# Patient Record
Sex: FEMALE
[De-identification: names, ages, dates, MRNs, and addresses within clinical notes are randomized per-mention and may not be internally consistent; named-entity substitution may affect disease eponyms.]

---

## 2017-08-20 ENCOUNTER — HOSPITAL ENCOUNTER (EMERGENCY)
Dept: HOSPITAL 42 - ED | Age: 62
Discharge: HOME | End: 2017-08-20
Payer: COMMERCIAL

## 2017-08-20 VITALS — OXYGEN SATURATION: 98 %

## 2017-08-20 VITALS — HEART RATE: 63 BPM | SYSTOLIC BLOOD PRESSURE: 115 MMHG | RESPIRATION RATE: 17 BRPM | DIASTOLIC BLOOD PRESSURE: 60 MMHG

## 2017-08-20 VITALS — TEMPERATURE: 98.5 F

## 2017-08-20 VITALS — BODY MASS INDEX: 27.4 KG/M2

## 2017-08-20 DIAGNOSIS — N39.0: Primary | ICD-10-CM

## 2017-08-20 DIAGNOSIS — R10.9: ICD-10-CM

## 2017-08-20 DIAGNOSIS — K42.9: ICD-10-CM

## 2017-08-20 LAB
ALBUMIN/GLOB SERPL: 1.4 {RATIO}
ALP SERPL-CCNC: 91 U/L
ALT SERPL-CCNC: 64 U/L
AMORPH SED URNS QL MICRO: (no result)
APPEARANCE UR: CLEAR
APTT BLD: 26 SECONDS
AST SERPL-CCNC: 74 U/L
BACTERIA #/AREA URNS HPF: (no result) /[HPF]
BASOPHILS # BLD AUTO: 0.03 K/MM3
BASOPHILS NFR BLD: 0.6 %
BILIRUB SERPL-MCNC: 0.6 MG/DL
BILIRUB UR-MCNC: NEGATIVE MG/DL
BUN SERPL-MCNC: 15 MG/DL
CALCIUM SERPL-MCNC: 9.4 MG/DL
CHLORIDE SERPL-SCNC: 104 MMOL/L
CO2 SERPL-SCNC: 26 MMOL/L
COLOR UR: YELLOW
EOSINOPHIL # BLD: 0.2 10*3/UL
EOSINOPHIL NFR BLD: 2.8 %
ERYTHROCYTE [DISTWIDTH] IN BLOOD BY AUTOMATED COUNT: 13.7 %
GLOBULIN SER-MCNC: 3.1 GM/DL
GLUCOSE SERPL-MCNC: 100 MG/DL
GLUCOSE UR STRIP-MCNC: NEGATIVE MG/DL
GRANULOCYTES # BLD: 2.78 10*3/UL
GRANULOCYTES NFR BLD: 52.5 %
HCT VFR BLD CALC: 39.1 %
INR PPP: 0.93
KETONES UR STRIP-MCNC: (no result) MG/DL
LEUKOCYTE ESTERASE UR-ACNC: (no result) LEU/UL
LIPASE SERPL-CCNC: 95 U/L
LYMPHOCYTES # BLD: 1.9 10*3/UL
LYMPHOCYTES NFR BLD AUTO: 36.7 %
MCH RBC QN AUTO: 29.7 PG
MCHC RBC AUTO-ENTMCNC: 33.8 G/DL
MCV RBC AUTO: 88.1 FL
MONOCYTES # BLD AUTO: 0.4 10*3/UL
MONOCYTES NFR BLD: 7.4 %
PH UR STRIP: 5.5 [PH]
PLATELET # BLD: 296 10^3/UL
PMV BLD AUTO: 8.2 FL
POTASSIUM SERPL-SCNC: 4.2 MMOL/L
PROT SERPL-MCNC: 7.3 G/DL
PROT UR STRIP-MCNC: NEGATIVE MG/DL
RBC # UR STRIP: NEGATIVE /UL
RBC #/AREA URNS HPF: (no result) /HPF
SODIUM SERPL-SCNC: 142 MMOL/L
SP GR UR STRIP: 1.02
UROBILINOGEN UR STRIP-ACNC: 0.2 E.U./DL
WBC # BLD AUTO: 5.3 10^3/UL

## 2017-08-20 PROCEDURE — 99285 EMERGENCY DEPT VISIT HI MDM: CPT

## 2017-08-20 PROCEDURE — 85610 PROTHROMBIN TIME: CPT

## 2017-08-20 PROCEDURE — 85730 THROMBOPLASTIN TIME PARTIAL: CPT

## 2017-08-20 PROCEDURE — 96375 TX/PRO/DX INJ NEW DRUG ADDON: CPT

## 2017-08-20 PROCEDURE — 83690 ASSAY OF LIPASE: CPT

## 2017-08-20 PROCEDURE — 85025 COMPLETE CBC W/AUTO DIFF WBC: CPT

## 2017-08-20 PROCEDURE — 80053 COMPREHEN METABOLIC PANEL: CPT

## 2017-08-20 PROCEDURE — 82948 REAGENT STRIP/BLOOD GLUCOSE: CPT

## 2017-08-20 PROCEDURE — 81001 URINALYSIS AUTO W/SCOPE: CPT

## 2017-08-20 PROCEDURE — 87086 URINE CULTURE/COLONY COUNT: CPT

## 2017-08-20 PROCEDURE — 86900 BLOOD TYPING SEROLOGIC ABO: CPT

## 2017-08-20 PROCEDURE — 74176 CT ABD & PELVIS W/O CONTRAST: CPT

## 2017-08-20 PROCEDURE — 86850 RBC ANTIBODY SCREEN: CPT

## 2017-08-20 PROCEDURE — 96374 THER/PROPH/DIAG INJ IV PUSH: CPT

## 2017-08-20 NOTE — CT
PROCEDURE:  CT Abdomen and Pelvis without intravenous contrast



HISTORY:

abd pain r/o obstruction



COMPARISON:

None.



TECHNIQUE:

Without contrast.. 



Contrast Dose: 0



Radiation dose:



Total exam DLP = 1024.63 mGy-cm.



This CT exam was performed using one or more of the following dose 

reduction techniques: Automated exposure control, adjustment of the 

mA and/or kV according to patient size, and/or use of iterative 

reconstruction technique.



FINDINGS:



LOWER THORAX:

Unremarkable. 



LIVER:

Unremarkable. No gross lesion or ductal dilatation.  



GALLBLADDER AND BILE DUCTS:

Unremarkable. 



PANCREAS:

Unremarkable. No gross lesion or ductal dilatation.



SPLEEN:

Unremarkable. 



ADRENALS:

Unremarkable. No mass. 



KIDNEYS AND URETERS:

Unremarkable. No hydronephrosis. No solid mass. 



VASCULATURE:

Unremarkable. No aortic aneurysm. 



BOWEL:

Unremarkable. No obstruction. No gross mural thickening. 



APPENDIX:

Unremarkable. Normal appendix. 



PERITONEUM:

Unremarkable. No free fluid. No free air. 



LYMPH NODES:

Unremarkable. No enlarged lymph nodes. 



BLADDER:

Nondistended 



REPRODUCTIVE:

Status post hysterectomy 



BONES:

Multilevel lumbar degenerative disc disease. 



OTHER FINDINGS:

None.



IMPRESSION:

No acute abnormality.  No evidence of bowel obstruction.
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
## 2018-01-28 ENCOUNTER — HOSPITAL ENCOUNTER (EMERGENCY)
Dept: HOSPITAL 42 - ED | Age: 63
Discharge: HOME | End: 2018-01-28
Payer: COMMERCIAL

## 2018-01-28 VITALS
DIASTOLIC BLOOD PRESSURE: 70 MMHG | TEMPERATURE: 97.7 F | SYSTOLIC BLOOD PRESSURE: 122 MMHG | HEART RATE: 80 BPM | OXYGEN SATURATION: 99 %

## 2018-01-28 VITALS — RESPIRATION RATE: 18 BRPM

## 2018-01-28 VITALS — BODY MASS INDEX: 33.8 KG/M2

## 2018-01-28 DIAGNOSIS — R21: ICD-10-CM

## 2018-01-28 DIAGNOSIS — L57.0: Primary | ICD-10-CM
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
## 2018-04-29 ENCOUNTER — HOSPITAL ENCOUNTER (EMERGENCY)
Dept: HOSPITAL 42 - ED | Age: 63
Discharge: HOME | End: 2018-04-29
Payer: COMMERCIAL

## 2018-04-29 VITALS — OXYGEN SATURATION: 98 %

## 2018-04-29 VITALS — RESPIRATION RATE: 18 BRPM

## 2018-04-29 VITALS — SYSTOLIC BLOOD PRESSURE: 114 MMHG | DIASTOLIC BLOOD PRESSURE: 66 MMHG | HEART RATE: 62 BPM

## 2018-04-29 VITALS — TEMPERATURE: 98.5 F

## 2018-04-29 VITALS — BODY MASS INDEX: 34.7 KG/M2

## 2018-04-29 DIAGNOSIS — R10.9: Primary | ICD-10-CM

## 2018-04-29 DIAGNOSIS — M06.9: ICD-10-CM

## 2018-04-29 DIAGNOSIS — I10: ICD-10-CM

## 2018-04-29 DIAGNOSIS — J44.9: ICD-10-CM

## 2018-04-29 DIAGNOSIS — I48.91: ICD-10-CM

## 2018-04-29 LAB
ALBUMIN SERPL-MCNC: 4.1 G/DL (ref 3–4.8)
ALBUMIN/GLOB SERPL: 1.3 {RATIO} (ref 1.1–1.8)
ALT SERPL-CCNC: 96 U/L (ref 7–56)
APPEARANCE UR: CLEAR
APTT BLD: 28.9 SECONDS (ref 25.1–36.5)
AST SERPL-CCNC: 153 U/L (ref 14–36)
BASOPHILS # BLD AUTO: 0.02 K/MM3 (ref 0–2)
BASOPHILS NFR BLD: 0.3 % (ref 0–3)
BILIRUB UR-MCNC: NEGATIVE MG/DL
BUN SERPL-MCNC: 10 MG/DL (ref 7–21)
CALCIUM SERPL-MCNC: 9 MG/DL (ref 8.4–10.5)
COLOR UR: YELLOW
EOSINOPHIL # BLD: 0.1 10*3/UL (ref 0–0.7)
EOSINOPHIL NFR BLD: 1.6 % (ref 1.5–5)
ERYTHROCYTE [DISTWIDTH] IN BLOOD BY AUTOMATED COUNT: 14 % (ref 11.5–14.5)
GFR NON-AFRICAN AMERICAN: > 60
GLUCOSE UR STRIP-MCNC: NEGATIVE MG/DL
GRANULOCYTES # BLD: 3.5 10*3/UL (ref 1.4–6.5)
GRANULOCYTES NFR BLD: 60.7 % (ref 50–68)
HGB BLD-MCNC: 12.8 G/DL (ref 12–16)
INR PPP: 1.04 (ref 0.93–1.08)
LEUKOCYTE ESTERASE UR-ACNC: NEGATIVE LEU/UL
LIPASE SERPL-CCNC: 58 U/L (ref 23–300)
LYMPHOCYTES # BLD: 1.8 10*3/UL (ref 1.2–3.4)
LYMPHOCYTES NFR BLD AUTO: 30.5 % (ref 22–35)
MCH RBC QN AUTO: 30 PG (ref 25–35)
MCHC RBC AUTO-ENTMCNC: 34 G/DL (ref 31–37)
MCV RBC AUTO: 88.1 FL (ref 80–105)
MONOCYTES # BLD AUTO: 0.4 10*3/UL (ref 0.1–0.6)
MONOCYTES NFR BLD: 6.9 % (ref 1–6)
PH UR STRIP: 6 [PH] (ref 4.7–8)
PLATELET # BLD: 287 10^3/UL (ref 120–450)
PMV BLD AUTO: 8.6 FL (ref 7–11)
PROT UR STRIP-MCNC: NEGATIVE MG/DL
PROTHROMBIN TIME: 12 SECONDS (ref 9.4–12.5)
RBC # BLD AUTO: 4.27 10^6/UL (ref 3.5–6.1)
RBC # UR STRIP: NEGATIVE /UL
SP GR UR STRIP: >= 1.03 (ref 1–1.03)
UROBILINOGEN UR STRIP-ACNC: 0.2 E.U./DL
WBC # BLD AUTO: 5.8 10^3/UL (ref 4.5–11)

## 2018-04-29 PROCEDURE — 99284 EMERGENCY DEPT VISIT MOD MDM: CPT

## 2018-04-29 PROCEDURE — 74176 CT ABD & PELVIS W/O CONTRAST: CPT

## 2018-04-29 PROCEDURE — 96374 THER/PROPH/DIAG INJ IV PUSH: CPT

## 2018-04-29 PROCEDURE — 80053 COMPREHEN METABOLIC PANEL: CPT

## 2018-04-29 PROCEDURE — 85025 COMPLETE CBC W/AUTO DIFF WBC: CPT

## 2018-04-29 PROCEDURE — 81003 URINALYSIS AUTO W/O SCOPE: CPT

## 2018-04-29 PROCEDURE — 85610 PROTHROMBIN TIME: CPT

## 2018-04-29 PROCEDURE — 85730 THROMBOPLASTIN TIME PARTIAL: CPT

## 2018-04-29 PROCEDURE — 83690 ASSAY OF LIPASE: CPT

## 2018-04-29 PROCEDURE — 87086 URINE CULTURE/COLONY COUNT: CPT
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
## 2018-04-29 NOTE — CT
PROCEDURE:  CT Abdomen and Pelvis with Oral contrast.



HISTORY:

abd pain r/o obstruction



COMPARISON:

Hernia.



TECHNIQUE:

Contiguous axial images of the abdomen and pelvis. Oral contrast was 

administered. No IV contrast given. Coronal and Sagittal reformats 

generated. 



Radiation dose:



Total exam DLP =  



This CT exam was performed using one or more of the following dose 

reduction techniques: Automated exposure control, adjustment of the 

mA and/or kV according to patient size, and/or use of iterative 

reconstruction technique.



FINDINGS:



LOWER THORAX:

Evaluation of the lung bases reveals mild interstitial change and 

scarring, without focal infiltrate or effusion.  Distal esophagus 

shows minor thickening without hiatal hernia. No pericardial effusion 

is seen. Visualized stomach and duodenum are unremarkable.



LIVER:

Noncontrast images of the liver show no evidence of new mass or 

intrahepatic ductal dilatation.



GALLBLADDER AND BILE DUCTS:

Unremarkable. 



PANCREAS:

Unremarkable. No mass. No ductal dilatation.



SPLEEN:

Unremarkable. No splenomegaly. 



ADRENALS:

Unremarkable. 



KIDNEYS AND URETERS:

Kidneys show no evidence of hydronephrosis, mass, or calculus. No 

perinephric changes are seen. 



BLADDER:

Bladder is decompressed but otherwise normal in outline.



REPRODUCTIVE:

Uterus appears to been previously removed. No pelvic masses are 

noted. 



APPENDIX:

Unremarkable.



BOWEL:

Colon shows no evidence of colonic wall thickening or pericolonic 

inflammatory change. No small bowel obstruction is appreciated. There 

may be some slight fold thickening of the small bowel which may 

suggest mild enteritis although no significant mesenteric thickening 

or adenopathy is noted. 



PERITONEUM:

No ascites is seen. There is evidence of a previously noted small 

umbilical hernia with fat but no appreciable bowel.



LYMPH NODES:

Unremarkable. No enlarged lymph nodes. 



VASCULATURE:

Unchanged 



BONES:

 Degenerative changes are seen in the spine. No lytic process is 

noted.



OTHER FINDINGS:

None. 



IMPRESSION:

No appreciable CT scan evidence of bowel obstruction.  No CT scan 

evidence of acute colitis.  Mild fold thickening of the small bowel 

which may suggest some mild enteritis. No CT scan evidence of 

appendicitis.  Stable small umbilical

## 2018-05-19 ENCOUNTER — HOSPITAL ENCOUNTER (EMERGENCY)
Dept: HOSPITAL 42 - ED | Age: 63
Discharge: HOME | End: 2018-05-19
Payer: COMMERCIAL

## 2018-05-19 VITALS — HEART RATE: 82 BPM | OXYGEN SATURATION: 99 % | SYSTOLIC BLOOD PRESSURE: 118 MMHG | DIASTOLIC BLOOD PRESSURE: 62 MMHG

## 2018-05-19 VITALS — RESPIRATION RATE: 18 BRPM | TEMPERATURE: 98.2 F

## 2018-05-19 VITALS — BODY MASS INDEX: 34.7 KG/M2

## 2018-05-19 DIAGNOSIS — B36.8: Primary | ICD-10-CM

## 2018-05-19 DIAGNOSIS — L03.311: ICD-10-CM
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
## 2018-12-21 ENCOUNTER — HOSPITAL ENCOUNTER (EMERGENCY)
Dept: HOSPITAL 42 - ED | Age: 63
Discharge: HOME | End: 2018-12-21
Payer: COMMERCIAL

## 2018-12-21 VITALS — HEART RATE: 66 BPM | DIASTOLIC BLOOD PRESSURE: 58 MMHG | SYSTOLIC BLOOD PRESSURE: 110 MMHG | OXYGEN SATURATION: 97 %

## 2018-12-21 VITALS — TEMPERATURE: 98.1 F | RESPIRATION RATE: 17 BRPM

## 2018-12-21 VITALS — BODY MASS INDEX: 26.8 KG/M2

## 2018-12-21 DIAGNOSIS — F41.9: ICD-10-CM

## 2018-12-21 DIAGNOSIS — I10: ICD-10-CM

## 2018-12-21 DIAGNOSIS — Z95.5: ICD-10-CM

## 2018-12-21 DIAGNOSIS — R00.2: Primary | ICD-10-CM

## 2018-12-21 DIAGNOSIS — J44.9: ICD-10-CM

## 2018-12-21 LAB
ALBUMIN SERPL-MCNC: 4.2 G/DL (ref 3–4.8)
ALBUMIN/GLOB SERPL: 1.3 {RATIO} (ref 1.1–1.8)
ALT SERPL-CCNC: 78 U/L (ref 7–56)
APTT BLD: 24.3 SECONDS (ref 25.1–36.5)
AST SERPL-CCNC: 109 U/L (ref 14–36)
BASOPHILS # BLD AUTO: 0.02 K/MM3 (ref 0–2)
BASOPHILS NFR BLD: 0.3 % (ref 0–3)
BNP SERPL-MCNC: 240 PG/ML (ref 0–450)
BUN SERPL-MCNC: 10 MG/DL (ref 7–21)
CALCIUM SERPL-MCNC: 9.1 MG/DL (ref 8.4–10.5)
EOSINOPHIL # BLD: 0 10*3/UL (ref 0–0.7)
EOSINOPHIL NFR BLD: 0.5 % (ref 1.5–5)
ERYTHROCYTE [DISTWIDTH] IN BLOOD BY AUTOMATED COUNT: 13.9 % (ref 11.5–14.5)
GFR NON-AFRICAN AMERICAN: > 60
GRANULOCYTES # BLD: 3.49 10*3/UL (ref 1.4–6.5)
GRANULOCYTES NFR BLD: 56.2 % (ref 50–68)
HGB BLD-MCNC: 13.6 G/DL (ref 12–16)
INR PPP: 1.07
LYMPHOCYTES # BLD: 2.3 10*3/UL (ref 1.2–3.4)
LYMPHOCYTES NFR BLD AUTO: 36.2 % (ref 22–35)
MCH RBC QN AUTO: 29.6 PG (ref 25–35)
MCHC RBC AUTO-ENTMCNC: 33.6 G/DL (ref 31–37)
MCV RBC AUTO: 88 FL (ref 80–105)
MONOCYTES # BLD AUTO: 0.4 10*3/UL (ref 0.1–0.6)
MONOCYTES NFR BLD: 6.8 % (ref 1–6)
PLATELET # BLD: 291 10^3/UL (ref 120–450)
PMV BLD AUTO: 8.7 FL (ref 7–11)
PROTHROMBIN TIME: 12.2 SECONDS (ref 9.4–12.5)
RBC # BLD AUTO: 4.6 10^6/UL (ref 3.5–6.1)
T4 FREE SERPL-MCNC: 1.33 NG/DL (ref 0.78–2.19)
TROPONIN I SERPL-MCNC: < 0.01 NG/ML
WBC # BLD AUTO: 6.2 10^3/UL (ref 4.5–11)

## 2018-12-21 PROCEDURE — 93005 ELECTROCARDIOGRAM TRACING: CPT

## 2018-12-21 PROCEDURE — 83615 LACTATE (LD) (LDH) ENZYME: CPT

## 2018-12-21 PROCEDURE — 84484 ASSAY OF TROPONIN QUANT: CPT

## 2018-12-21 PROCEDURE — 99283 EMERGENCY DEPT VISIT LOW MDM: CPT

## 2018-12-21 PROCEDURE — 85025 COMPLETE CBC W/AUTO DIFF WBC: CPT

## 2018-12-21 PROCEDURE — 84100 ASSAY OF PHOSPHORUS: CPT

## 2018-12-21 PROCEDURE — 83735 ASSAY OF MAGNESIUM: CPT

## 2018-12-21 PROCEDURE — 96374 THER/PROPH/DIAG INJ IV PUSH: CPT

## 2018-12-21 PROCEDURE — 85730 THROMBOPLASTIN TIME PARTIAL: CPT

## 2018-12-21 PROCEDURE — 85610 PROTHROMBIN TIME: CPT

## 2018-12-21 PROCEDURE — 83880 ASSAY OF NATRIURETIC PEPTIDE: CPT

## 2018-12-21 PROCEDURE — 71045 X-RAY EXAM CHEST 1 VIEW: CPT

## 2018-12-21 PROCEDURE — 80053 COMPREHEN METABOLIC PANEL: CPT

## 2018-12-21 PROCEDURE — 84443 ASSAY THYROID STIM HORMONE: CPT

## 2018-12-21 PROCEDURE — 82550 ASSAY OF CK (CPK): CPT

## 2018-12-21 PROCEDURE — 84439 ASSAY OF FREE THYROXINE: CPT

## 2018-12-21 NOTE — RAD
Date of service: 



12/21/2018



HISTORY:

 chest pain 



COMPARISON:

12/20/2015 



FINDINGS:



LUNGS:

No active pulmonary disease.



PLEURA:

No significant pleural effusion identified, no pneumothorax apparent.



CARDIOVASCULAR:

No aortic atherosclerotic calcification present.



Normal cardiac size. No pulmonary vascular congestion. 



OSSEOUS STRUCTURES:

No significant abnormalities.



VISUALIZED UPPER ABDOMEN:

Normal.



OTHER FINDINGS:

None.



IMPRESSION:

No active disease.
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
## 2018-12-21 NOTE — CARD
--------------- APPROVED REPORT --------------





Date of service: 12/21/2018



EKG Measurement

Heart [id number]

[id number]

[id number]

[id number]

[id number]



<Conclusion>

Sinus rhythm with 1st degree AV block with premature atrial complex 

with aberrant conduction

STTW changes c/w ischemia

## 2018-12-25 ENCOUNTER — HOSPITAL ENCOUNTER (EMERGENCY)
Dept: HOSPITAL 42 - ED | Age: 63
Discharge: HOME | End: 2018-12-25
Payer: COMMERCIAL

## 2018-12-25 VITALS
OXYGEN SATURATION: 98 % | DIASTOLIC BLOOD PRESSURE: 82 MMHG | HEART RATE: 72 BPM | RESPIRATION RATE: 17 BRPM | SYSTOLIC BLOOD PRESSURE: 120 MMHG

## 2018-12-25 VITALS — TEMPERATURE: 98.2 F

## 2018-12-25 VITALS — BODY MASS INDEX: 37.5 KG/M2

## 2018-12-25 DIAGNOSIS — N23: Primary | ICD-10-CM

## 2018-12-25 DIAGNOSIS — Z95.5: ICD-10-CM

## 2018-12-25 DIAGNOSIS — E11.9: ICD-10-CM

## 2018-12-25 DIAGNOSIS — I10: ICD-10-CM

## 2018-12-25 LAB
ALBUMIN SERPL-MCNC: 4.4 G/DL (ref 3–4.8)
ALBUMIN/GLOB SERPL: 1.3 {RATIO} (ref 1.1–1.8)
ALT SERPL-CCNC: 82 U/L (ref 7–56)
APPEARANCE UR: CLEAR
AST SERPL-CCNC: 119 U/L (ref 14–36)
BASE EXCESS BLDV CALC-SCNC: 2.6 MMOL/L (ref 0–2)
BASOPHILS # BLD AUTO: 0.02 K/MM3 (ref 0–2)
BASOPHILS NFR BLD: 0.3 % (ref 0–3)
BILIRUB UR-MCNC: NEGATIVE MG/DL
BUN SERPL-MCNC: 18 MG/DL (ref 7–21)
CALCIUM SERPL-MCNC: 9.2 MG/DL (ref 8.4–10.5)
COLOR UR: (no result)
EOSINOPHIL # BLD: 0.1 10*3/UL (ref 0–0.7)
EOSINOPHIL NFR BLD: 1.4 % (ref 1.5–5)
ERYTHROCYTE [DISTWIDTH] IN BLOOD BY AUTOMATED COUNT: 13.8 % (ref 11.5–14.5)
GFR NON-AFRICAN AMERICAN: > 60
GLUCOSE UR STRIP-MCNC: NEGATIVE MG/DL
GRANULOCYTES # BLD: 3.8 10*3/UL (ref 1.4–6.5)
GRANULOCYTES NFR BLD: 51.7 % (ref 50–68)
HGB BLD-MCNC: 13.8 G/DL (ref 12–16)
LEUKOCYTE ESTERASE UR-ACNC: NEGATIVE LEU/UL
LIPASE SERPL-CCNC: 88 U/L (ref 23–300)
LYMPHOCYTES # BLD: 2.9 10*3/UL (ref 1.2–3.4)
LYMPHOCYTES NFR BLD AUTO: 39.7 % (ref 22–35)
MCH RBC QN AUTO: 29.8 PG (ref 25–35)
MCHC RBC AUTO-ENTMCNC: 33.7 G/DL (ref 31–37)
MCV RBC AUTO: 88.6 FL (ref 80–105)
MONOCYTES # BLD AUTO: 0.5 10*3/UL (ref 0.1–0.6)
MONOCYTES NFR BLD: 6.9 % (ref 1–6)
PH BLDV: 7.43 [PH] (ref 7.32–7.43)
PH UR STRIP: 5.5 [PH] (ref 4.7–8)
PLATELET # BLD: 315 10^3/UL (ref 120–450)
PMV BLD AUTO: 8.8 FL (ref 7–11)
PROT UR STRIP-MCNC: NEGATIVE MG/DL
RBC # BLD AUTO: 4.63 10^6/UL (ref 3.5–6.1)
RBC # UR STRIP: NEGATIVE /UL
SP GR UR STRIP: >= 1.03 (ref 1–1.03)
UROBILINOGEN UR STRIP-ACNC: 0.2 E.U./DL
VENOUS BLOOD FIO2: 21 %
VENOUS BLOOD GAS PCO2: 41 (ref 40–60)
VENOUS BLOOD GAS PO2: 162 MM/HG (ref 30–55)
WBC # BLD AUTO: 7.4 10^3/UL (ref 4.5–11)

## 2018-12-25 PROCEDURE — 99284 EMERGENCY DEPT VISIT MOD MDM: CPT

## 2018-12-25 PROCEDURE — 82803 BLOOD GASES ANY COMBINATION: CPT

## 2018-12-25 PROCEDURE — 80053 COMPREHEN METABOLIC PANEL: CPT

## 2018-12-25 PROCEDURE — 81003 URINALYSIS AUTO W/O SCOPE: CPT

## 2018-12-25 PROCEDURE — 83690 ASSAY OF LIPASE: CPT

## 2018-12-25 PROCEDURE — 71045 X-RAY EXAM CHEST 1 VIEW: CPT

## 2018-12-25 PROCEDURE — 93005 ELECTROCARDIOGRAM TRACING: CPT

## 2018-12-25 PROCEDURE — 96361 HYDRATE IV INFUSION ADD-ON: CPT

## 2018-12-25 PROCEDURE — 96374 THER/PROPH/DIAG INJ IV PUSH: CPT

## 2018-12-25 PROCEDURE — 83735 ASSAY OF MAGNESIUM: CPT

## 2018-12-25 PROCEDURE — 85025 COMPLETE CBC W/AUTO DIFF WBC: CPT

## 2018-12-25 PROCEDURE — 74176 CT ABD & PELVIS W/O CONTRAST: CPT
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
## 2018-12-26 NOTE — RAD
Date of service: 



12/25/2018



HISTORY:

 abdominal pain 



COMPARISON:

Portable chest 12/21/2018. 



FINDINGS:



LUNGS:

No active pulmonary disease.



PLEURA:

No significant pleural effusion identified, no pneumothorax apparent.



CARDIOVASCULAR:

Calcific atherosclerotic changes are seen related to the thoracic 

aorta.



Normal cardiac size. No pulmonary vascular congestion. 



OSSEOUS STRUCTURES:

No significant abnormalities.



VISUALIZED UPPER ABDOMEN:

Mild right hemidiaphragm elevation noted.



OTHER FINDINGS:

None.



IMPRESSION:

No definite acute cardiopulmonary disease appreciable.  Mild right 

hemidiaphragm elevation noted.

## 2018-12-26 NOTE — CT
Date of service: 



12/25/2018



PROCEDURE:  CT Abdomen and Pelvis without intravenous contrast



HISTORY:

possible kidney stones



COMPARISON:

None.



TECHNIQUE:

Technique. 



Contrast dose: 



Radiation dose:



Total exam DLP = 966.31 mGy-cm.



This CT exam was performed using one or more of the following dose 

reduction techniques: Automated exposure control, adjustment of the 

mA and/or kV according to patient size, and/or use of iterative 

reconstruction technique.



FINDINGS:



LOWER THORAX:

Unremarkable.  Small hiatal hernia. 



LIVER:

Unremarkable. No gross lesion or ductal dilatation.  



GALLBLADDER AND BILE DUCTS:

Unremarkable. 



PANCREAS:

Unremarkable. No gross lesion or ductal dilatation.



SPLEEN:

Unremarkable. 



ADRENALS:

Unremarkable. No mass. 



KIDNEYS AND URETERS:

Unremarkable. No hydronephrosis. No solid mass. 



VASCULATURE:

Unremarkable. No aortic aneurysm. Aortic calcifications.



BOWEL:

Colonic diverticulosis no obstruction. No gross mural thickening. 



APPENDIX:

Unremarkable. Normal appendix. 



PERITONEUM:

Unremarkable. No free fluid. No free air. 



LYMPH NODES:

Unremarkable. No enlarged lymph nodes. 



BLADDER:

Unremarkable. 



REPRODUCTIVE:

Hysterectomy. 



BONES:

No acute fracture. 



OTHER FINDINGS:

None.



IMPRESSION:

No acute pathology.

## 2018-12-27 NOTE — CARD
--------------- APPROVED REPORT --------------





Date of service: 12/25/2018



EKG Measurement

Heart [id number]

[id number]

[id number]

[id number]

[id number]



<Conclusion>

Sinus rhythm with 1st degree AV block

Possible Left atrial enlargement

Low voltage QRS

Borderline ECG

## 2019-01-07 ENCOUNTER — HOSPITAL ENCOUNTER (OUTPATIENT)
Dept: HOSPITAL 42 - ED | Age: 64
Setting detail: OBSERVATION
LOS: 2 days | Discharge: HOME | End: 2019-01-09
Attending: INTERNAL MEDICINE | Admitting: INTERNAL MEDICINE
Payer: COMMERCIAL

## 2019-01-07 VITALS — BODY MASS INDEX: 36.6 KG/M2

## 2019-01-07 VITALS — OXYGEN SATURATION: 97 %

## 2019-01-07 DIAGNOSIS — I10: ICD-10-CM

## 2019-01-07 DIAGNOSIS — I25.110: Primary | ICD-10-CM

## 2019-01-07 DIAGNOSIS — Z79.82: ICD-10-CM

## 2019-01-07 DIAGNOSIS — E11.9: ICD-10-CM

## 2019-01-07 DIAGNOSIS — E78.00: ICD-10-CM

## 2019-01-07 DIAGNOSIS — Z95.5: ICD-10-CM

## 2019-01-07 DIAGNOSIS — Z79.02: ICD-10-CM

## 2019-01-07 DIAGNOSIS — Z87.891: ICD-10-CM

## 2019-01-07 LAB
ALBUMIN SERPL-MCNC: 4.4 G/DL (ref 3–4.8)
ALBUMIN/GLOB SERPL: 1.4 {RATIO} (ref 1.1–1.8)
ALT SERPL-CCNC: 75 U/L (ref 7–56)
AST SERPL-CCNC: 125 U/L (ref 14–36)
BASOPHILS # BLD AUTO: 0.01 K/MM3 (ref 0–2)
BASOPHILS NFR BLD: 0.1 % (ref 0–3)
BUN SERPL-MCNC: 12 MG/DL (ref 7–21)
CALCIUM SERPL-MCNC: 9.4 MG/DL (ref 8.4–10.5)
EOSINOPHIL # BLD: 0.1 10*3/UL (ref 0–0.7)
EOSINOPHIL NFR BLD: 1.3 % (ref 1.5–5)
ERYTHROCYTE [DISTWIDTH] IN BLOOD BY AUTOMATED COUNT: 13.8 % (ref 11.5–14.5)
GFR NON-AFRICAN AMERICAN: > 60
GRANULOCYTES # BLD: 4.76 10*3/UL (ref 1.4–6.5)
GRANULOCYTES NFR BLD: 59.6 % (ref 50–68)
HGB BLD-MCNC: 13.1 G/DL (ref 12–16)
LYMPHOCYTES # BLD: 2.6 10*3/UL (ref 1.2–3.4)
LYMPHOCYTES NFR BLD AUTO: 32.7 % (ref 22–35)
MCH RBC QN AUTO: 29.7 PG (ref 25–35)
MCHC RBC AUTO-ENTMCNC: 33.4 G/DL (ref 31–37)
MCV RBC AUTO: 88.9 FL (ref 80–105)
MONOCYTES # BLD AUTO: 0.5 10*3/UL (ref 0.1–0.6)
MONOCYTES NFR BLD: 6.3 % (ref 1–6)
PLATELET # BLD: 291 10^3/UL (ref 120–450)
PMV BLD AUTO: 8.5 FL (ref 7–11)
RBC # BLD AUTO: 4.41 10^6/UL (ref 3.5–6.1)
TROPONIN I SERPL-MCNC: < 0.01 NG/ML
WBC # BLD AUTO: 8 10^3/UL (ref 4.5–11)

## 2019-01-07 PROCEDURE — 82948 REAGENT STRIP/BLOOD GLUCOSE: CPT

## 2019-01-07 PROCEDURE — 36415 COLL VENOUS BLD VENIPUNCTURE: CPT

## 2019-01-07 PROCEDURE — 80061 LIPID PANEL: CPT

## 2019-01-07 PROCEDURE — 82550 ASSAY OF CK (CPK): CPT

## 2019-01-07 PROCEDURE — 84100 ASSAY OF PHOSPHORUS: CPT

## 2019-01-07 PROCEDURE — 84484 ASSAY OF TROPONIN QUANT: CPT

## 2019-01-07 PROCEDURE — 84439 ASSAY OF FREE THYROXINE: CPT

## 2019-01-07 PROCEDURE — 84443 ASSAY THYROID STIM HORMONE: CPT

## 2019-01-07 PROCEDURE — 96374 THER/PROPH/DIAG INJ IV PUSH: CPT

## 2019-01-07 PROCEDURE — 99152 MOD SED SAME PHYS/QHP 5/>YRS: CPT

## 2019-01-07 PROCEDURE — 99285 EMERGENCY DEPT VISIT HI MDM: CPT

## 2019-01-07 PROCEDURE — 83735 ASSAY OF MAGNESIUM: CPT

## 2019-01-07 PROCEDURE — 71045 X-RAY EXAM CHEST 1 VIEW: CPT

## 2019-01-07 PROCEDURE — 93005 ELECTROCARDIOGRAM TRACING: CPT

## 2019-01-07 PROCEDURE — 85025 COMPLETE CBC W/AUTO DIFF WBC: CPT

## 2019-01-07 PROCEDURE — 80053 COMPREHEN METABOLIC PANEL: CPT

## 2019-01-07 PROCEDURE — 93458 L HRT ARTERY/VENTRICLE ANGIO: CPT
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
## 2019-01-08 LAB
ALBUMIN SERPL-MCNC: 3.7 G/DL (ref 3–4.8)
ALBUMIN/GLOB SERPL: 1.3 {RATIO} (ref 1.1–1.8)
ALT SERPL-CCNC: 69 U/L (ref 7–56)
AST SERPL-CCNC: 88 U/L (ref 14–36)
BASOPHILS # BLD AUTO: 0.01 K/MM3 (ref 0–2)
BASOPHILS NFR BLD: 0.2 % (ref 0–3)
BUN SERPL-MCNC: 14 MG/DL (ref 7–21)
CALCIUM SERPL-MCNC: 8.7 MG/DL (ref 8.4–10.5)
EOSINOPHIL # BLD: 0.1 10*3/UL (ref 0–0.7)
EOSINOPHIL NFR BLD: 1.6 % (ref 1.5–5)
ERYTHROCYTE [DISTWIDTH] IN BLOOD BY AUTOMATED COUNT: 13.8 % (ref 11.5–14.5)
GFR NON-AFRICAN AMERICAN: > 60
GRANULOCYTES # BLD: 2.52 10*3/UL (ref 1.4–6.5)
GRANULOCYTES NFR BLD: 45.2 % (ref 50–68)
HGB BLD-MCNC: 11.4 G/DL (ref 12–16)
LYMPHOCYTES # BLD: 2.6 10*3/UL (ref 1.2–3.4)
LYMPHOCYTES NFR BLD AUTO: 45.8 % (ref 22–35)
MCH RBC QN AUTO: 28.8 PG (ref 25–35)
MCHC RBC AUTO-ENTMCNC: 32.4 G/DL (ref 31–37)
MCV RBC AUTO: 88.9 FL (ref 80–105)
MONOCYTES # BLD AUTO: 0.4 10*3/UL (ref 0.1–0.6)
MONOCYTES NFR BLD: 7.2 % (ref 1–6)
PLATELET # BLD: 264 10^3/UL (ref 120–450)
PMV BLD AUTO: 8.9 FL (ref 7–11)
RBC # BLD AUTO: 3.96 10^6/UL (ref 3.5–6.1)
WBC # BLD AUTO: 5.6 10^3/UL (ref 4.5–11)

## 2019-01-08 RX ADMIN — INSULIN HUMAN SCH: 100 INJECTION, SOLUTION PARENTERAL at 16:11

## 2019-01-08 RX ADMIN — INSULIN HUMAN SCH UNITS: 100 INJECTION, SOLUTION PARENTERAL at 11:40

## 2019-01-08 RX ADMIN — INSULIN HUMAN SCH: 100 INJECTION, SOLUTION PARENTERAL at 11:14

## 2019-01-08 NOTE — CP.PCM.PN
Subjective





- Date & Time of Evaluation


Date of Evaluation: 01/08/19


Time of Evaluation: 07:00





- Subjective


Subjective: 





Pt seen and examined this morning. Pt reports chest pain, which has improved and

some mild trouble breathing. 





Objective





- Vital Signs/Intake and Output


Vital Signs (last 24 hours): 


                                        











Temp Pulse Resp BP Pulse Ox


 


 97.8 F   76   19   136/74   97 


 


 01/08/19 18:00  01/08/19 18:00  01/08/19 18:00  01/08/19 18:00  01/07/19 22:02








Intake and Output: 


                                        











 01/08/19 01/09/19





 18:59 06:59


 


Intake Total 900 


 


Balance 900 














- Medications


Medications: 


                               Current Medications





Aspirin (Ecotrin)  81 mg PO DAILY [facility]


   Last Admin: 01/08/19 11:02 Dose:  81 mg


Atorvastatin Calcium (Lipitor)  40 mg PO DIN [facility]


   Last Admin: 01/08/19 17:33 Dose:  40 mg


Clopidogrel Bisulfate (Plavix)  75 mg PO DAILY [facility]


   Last Admin: 01/08/19 11:02 Dose:  75 mg


Sodium Chloride (Sodium Chloride 0.9%)  1,000 mls @ 75 mls/hr IV .[id number] [facility]


   Last Admin: 01/08/19 14:21 Dose:  75 mls/hr


Insulin Human Regular (Humulin R Low)  0 units SC [facility]; Protocol


   Last Admin: 01/08/19 16:11 Dose:  Not Given


Metoprolol Tartrate (Lopressor)  50 mg PO BID [facility]


   Last Admin: 01/08/19 17:33 Dose:  50 mg


Nitroglycerin (Nitro-Bid 2% Oint)  1 ea TOP Q6 PRN


   PRN Reason: chest pain


   Last Admin: 01/08/19 01:15 Dose:  1 ea











- Labs


Labs: 


                                        





                                 01/08/19 06:00 





                                 01/08/19 06:00 











- Constitutional


Appears: No Acute Distress





- Head Exam


Head Exam: ATRAUMATIC, NORMOCEPHALIC





- Eye Exam


Eye Exam: EOMI





- ENT Exam


ENT Exam: Mucous Membranes Moist





- Neck Exam


Neck Exam: Full ROM





- Respiratory Exam


Respiratory Exam: Clear to Ausculation Bilateral, NORMAL BREATHING PATTERN.  

absent: Accessory Muscle Use, Respiratory Distress





- Cardiovascular Exam


Cardiovascular Exam: RRR, +S1, +S2.  absent: Diastolic murmur, Murmur





- GI/Abdominal Exam


GI & Abdominal Exam: Soft, Normal Bowel Sounds





- Extremities Exam


Extremities Exam: Full ROM.  absent: Calf Tenderness, Pedal Edema





- Neurological Exam


Neurological Exam: Alert, Awake, Oriented x3





- Psychiatric Exam


Psychiatric exam: Normal Affect, Normal Mood





- Skin


Skin Exam: Dry, Intact, Warm





Assessment and Plan





- Assessment and Plan (Free Text)


Assessment: 





Pt is a 62yo female with a PMH of CAD with stent placement and DM who presented 

to the ED with left sided chest pain.  


Plan: 





Chest Pain, rule out ACS


- troponin negative x2


- EKG shows no signs of ischemia 


- Dr Luu, place pt on ASA and plavix, cardiac cath in the morning 





CAD


- ASA


- plavix


- metoprolol





DM


- glucose 136


- accuchecks





Transaminitis


- AST 88


- ALT 69


- continue to monitor 





Ppx


- HHD








Pt seen, examined, assessment and plan discussed with Dr Mitra Knight PGY1, Internal Medicine Resident

## 2019-01-08 NOTE — RAD
Date of service: 



01/07/2019



HISTORY:

 chest pain 



COMPARISON:

12/25/2018. 



FINDINGS:



LUNGS:

The lungs are well inflated and clear.



PLEURA:

No pleural effusions or pneumothorax. 



CARDIOVASCULAR:

The heart is normal in size.  No aortic atherosclerotic calcification 

present. 



OSSEOUS STRUCTURES:

Within normal limits for the patient's age.



VISUALIZED UPPER ABDOMEN:

Normal.



OTHER FINDINGS:

None.



IMPRESSION:

No active pulmonary disease.

## 2019-01-08 NOTE — CARD
--------------- APPROVED REPORT --------------





Date of service: 01/07/2019



EKG Measurement

Heart [id number]

[place] [id number]

[id number]

[id number]

[id number]



<Conclusion>

Sinus rhythm with 1st degree AV block with premature ventricular 

complexes 

Otherwise normal ECG

## 2019-01-08 NOTE — CON
DATE:  01/08/2019



CARDIOLOGY CONSULTATION



HISTORY:  The patient is a 63-year-old woman, who presents with classic

angina.



PAST MEDICAL HISTORY:  The patient's past medical history is notable for

hypertension, diabetes mellitus and hypercholesterolemia.  She underwent a

stress test 2 weeks ago, which was abnormal.  She was trying to decide on

whether she would agree to cardiac catheterization.



She presented to the emergency room with progressive symptoms including

angina at rest.



SOCIAL HISTORY:  The patient denies smoking.



REVIEW OF SYSTEMS:  Fourteen-point review of systems is reviewed in detail.

No ulcers.  No edema.  No dyspnea noted.  No bleeding history.



PHYSICAL EXAMINATION:

VITAL SIGNS:  Blood pressure is 99/63, heart rate is in the 60s.

NECK:  Negative JVD.

LUNGS:  Without rales.

CARDIOVASCULAR:  Heart rate S1, S2.

EXTREMITIES:  Without edema.



EKG shows normal sinus rhythm with nonspecific ST-T changes.



LABORATORY DATA:  Hemoglobin is 11.4.  Chemistries, BUN and creatinine,

unremarkable.  Glucose is 191.  Troponins are negative x2.



IMPRESSION:

1.  Unstable angina.

2.  High probability for coronary artery disease.

3.  Diabetes mellitus.

4.  Hypercholesterolemia.



PLAN:  Given these findings, we will place the patient on aspirin and

Plavix.  We will proceed to cardiac catheterization in the morning.  Risks

and benefits have been discussed with the patient in detail.







__________________________________________

Bryce Luu MD





DD:  01/08/2019 13:42:25

DT:  01/08/2019 13:45:54

Job # [id number]

## 2019-01-09 VITALS — TEMPERATURE: 98 F | HEART RATE: 76 BPM

## 2019-01-09 VITALS — DIASTOLIC BLOOD PRESSURE: 64 MMHG | SYSTOLIC BLOOD PRESSURE: 110 MMHG

## 2019-01-09 VITALS — RESPIRATION RATE: 20 BRPM

## 2019-01-09 LAB
ALBUMIN SERPL-MCNC: 3.9 G/DL (ref 3–4.8)
ALBUMIN/GLOB SERPL: 1.3 {RATIO} (ref 1.1–1.8)
ALT SERPL-CCNC: 69 U/L (ref 7–56)
AST SERPL-CCNC: 91 U/L (ref 14–36)
BASOPHILS # BLD AUTO: 0.01 K/MM3 (ref 0–2)
BASOPHILS NFR BLD: 0.2 % (ref 0–3)
BUN SERPL-MCNC: 10 MG/DL (ref 7–21)
CALCIUM SERPL-MCNC: 8.7 MG/DL (ref 8.4–10.5)
EOSINOPHIL # BLD: 0.1 10*3/UL (ref 0–0.7)
EOSINOPHIL NFR BLD: 1.6 % (ref 1.5–5)
ERYTHROCYTE [DISTWIDTH] IN BLOOD BY AUTOMATED COUNT: 13.9 % (ref 11.5–14.5)
GFR NON-AFRICAN AMERICAN: > 60
GRANULOCYTES # BLD: 2.88 10*3/UL (ref 1.4–6.5)
GRANULOCYTES NFR BLD: 49.6 % (ref 50–68)
HDLC SERPL-MCNC: 45 MG/DL (ref 29–60)
HGB BLD-MCNC: 12 G/DL (ref 12–16)
LDLC SERPL-MCNC: 113 MG/DL (ref 0–129)
LYMPHOCYTES # BLD: 2.5 10*3/UL (ref 1.2–3.4)
LYMPHOCYTES NFR BLD AUTO: 43.1 % (ref 22–35)
MCH RBC QN AUTO: 29 PG (ref 25–35)
MCHC RBC AUTO-ENTMCNC: 32.7 G/DL (ref 31–37)
MCV RBC AUTO: 88.6 FL (ref 80–105)
MONOCYTES # BLD AUTO: 0.3 10*3/UL (ref 0.1–0.6)
MONOCYTES NFR BLD: 5.5 % (ref 1–6)
PLATELET # BLD: 265 10^3/UL (ref 120–450)
PMV BLD AUTO: 8.8 FL (ref 7–11)
RBC # BLD AUTO: 4.14 10^6/UL (ref 3.5–6.1)
T4 FREE SERPL-MCNC: 1.16 NG/DL (ref 0.78–2.19)
WBC # BLD AUTO: 5.8 10^3/UL (ref 4.5–11)

## 2019-01-09 RX ADMIN — INSULIN HUMAN SCH: 100 INJECTION, SOLUTION PARENTERAL at 06:41

## 2019-01-09 RX ADMIN — INSULIN HUMAN SCH: 100 INJECTION, SOLUTION PARENTERAL at 08:44

## 2019-01-09 RX ADMIN — INSULIN HUMAN SCH: 100 INJECTION, SOLUTION PARENTERAL at 11:30

## 2019-01-09 RX ADMIN — INSULIN HUMAN SCH UNITS: 100 INJECTION, SOLUTION PARENTERAL at 18:06

## 2019-01-09 NOTE — CARDCATH
PROCEDURE DATE:  01/09/2019



HISTORY:  The patient is a 63-year-old woman with multiple cardiac risk

factors including diabetes mellitus, hypertension and hypercholesterolemia

with documented PTCA and stent of the circumflex artery in the past who

presents with symptoms consistent with unstable angina.  Troponins were

negative.  However, a stress test performed 1 week ago revealed new

ischemic changes in the anterior wall.  Because of this, cardiac

catheterization was recommended.



PROCEDURE:  Left and heart catheterization with coronary arteriography and

left ventriculogram.



The right femoral artery was cannulated with 6-Turkish sheath.  There were

no complications.



I performed moderate sedation which included the presence of an independent

trained observer that assisted in monitoring the patient's level of

consciousness and physiologic status.  After administration of Versed and

fentanyl, my intra-service time was 15 minutes.



Findings on catheterization revealed a left ventricle that contracted

normally.  Estimated ejection fraction of 60%.



The patient had a right dominant circulation.  The RCA revealed intimal

irregularities without significant stenoses.



The left main artery was unremarkable.



The circumflex artery and obtuse marginal branches revealed intimal

irregularities with a patent stent in the midportion.



The LAD and diagonal vessels revealed a 20% stenosis in the proximal

portion of the LAD without critical lesions.



Angio-Seal was used to close the femoral artery site.  The patient

tolerated the procedure well.



SUMMARY:  The procedure revealed,

1.  Normal left ventricular function.

2.  Diffuse atherosclerosis in the coronary tree, but no critical lesions

noted.

3.  A patent stent in the circumflex artery.

4.  Consistent with single vessel coronary artery disease.



PLAN:  Given these findings, the patient's treatment should be daily

aspirin, and a cardiac risk reduction program which should include statin

therapy as well as weight reduction.  Her chest pain cannot be explained by

ischemic coronary disease.  From a cardiac perspective, the patient can be

discharged today after 5 o'clock.





__________________________________________

Bryce Luu MD





DD:  01/09/2019 9:47:10

DT:  01/09/2019 9:50:19

Job # [id number]

## 2019-01-09 NOTE — CP.PCM.DIS
Provider





- Provider


Date of Admission: 


01/07/19 20:54





Attending physician: 


Seth Manriquez MD





Consults: 








01/07/19 20:26


Consult [Physician Consult] Stat 


   Comment: 


   Consulting Provider: Bryce Luu


   Consulting Physician: Bryce Luu


   Reason for Consult: chest pain





01/08/19 04:27


Transition In Care/Readmission Reduction Routine 


   Comment: 


   Physician Instructions: 


   Reason For Exam: protocol





01/08/19 04:38


Case Management Referral Routine 


   Comment: 


   Physician Instructions: 


   Reason For Exam: protocol


   Reason for Referral: Discharge Planning











Time Spent in preparation of Discharge (in minutes): 35





Diagnosis





- Discharge Diagnosis


(1) Chest pain


Status: Acute   Priority: High   





(2) CAD (coronary artery disease)


Status: Chronic   Priority: High   





(3) Diabetes


Status: Chronic   Priority: High   





Hospital Course





- Lab Results


Lab Results: 


                             Most Recent Lab Values











WBC  5.8 10^3/uL (4.5-11.0)   01/09/19  06:00    


 


RBC  4.14 10^6/uL (3.5-6.1)   01/09/19  06:00    


 


Hgb  12.0 g/dL (12.0-16.0)   01/09/19  06:00    


 


Hct  36.7 % (36.0-48.0)   01/09/19  06:00    


 


MCV  88.6 fl (80.0-105.0)   01/09/19  06:00    


 


MCH  29.0 pg (25.0-35.0)   01/09/19  06:00    


 


MCHC  32.7 g/dl (31.0-37.0)   01/09/19  06:00    


 


RDW  13.9 % (11.5-14.5)   01/09/19  06:00    


 


Plt Count  265 10^3/uL (120.0-450.0)   01/09/19  06:00    


 


MPV  8.8 fl (7.0-11.0)   01/09/19  06:00    


 


Gran %  49.6 % (50.0-68.0)  L  01/09/19  06:00    


 


Lymph % (Auto)  43.1 % (22.0-35.0)  H  01/09/19  06:00    


 


Mono % (Auto)  5.5 % (1.0-6.0)   01/09/19  06:00    


 


Eos % (Auto)  1.6 % (1.5-5.0)   01/09/19  06:00    


 


Baso % (Auto)  0.2 % (0.0-3.0)   01/09/19  06:00    


 


Gran #  2.88  (1.4-6.5)   01/09/19  06:00    


 


Lymph # (Auto)  2.5  (1.2-3.4)   01/09/19  06:00    


 


Mono # (Auto)  0.3  (0.1-0.6)   01/09/19  06:00    


 


Eos # (Auto)  0.1  (0.0-0.7)   01/09/19  06:00    


 


Baso # (Auto)  0.01 K/mm3 (0.0-2.0)   01/09/19  06:00    


 


Sodium  139 mmol/L (132-148)   01/09/19  06:00    


 


Potassium  4.0 mmol/L (3.6-5.0)   01/09/19  06:00    


 


Chloride  109 mmol/L ()  H  01/09/19  06:00    


 


Carbon Dioxide  25 mmol/L (21-33)   01/09/19  06:00    


 


Anion Gap  10  (10-20)   01/09/19  06:00    


 


BUN  10 mg/dL (7-21)   01/09/19  06:00    


 


Creatinine  0.6 mg/dl (0.7-1.2)  L  01/09/19  06:00    


 


Est GFR ( Amer)  > 60   01/09/19  06:00    


 


Est GFR (Non-Af Amer)  > 60   01/09/19  06:00    


 


POC Glucose (mg/dL)  287 mg/dL ()  H  01/09/19  15:52    


 


Random Glucose  131 mg/dL ()  H  01/09/19  06:00    


 


Calcium  8.7 mg/dL (8.4-10.5)   01/09/19  06:00    


 


Phosphorus  3.8 mg/dL (2.5-4.5)   01/09/19  06:00    


 


Magnesium  1.9 mg/dL (1.7-2.2)   01/09/19  06:00    


 


Total Bilirubin  0.9 mg/dL (0.2-1.3)   01/09/19  06:00    


 


AST  91 U/L (14-36)  H  01/09/19  06:00    


 


ALT  69 U/L (7-56)  H  01/09/19  06:00    


 


Alkaline Phosphatase  97 U/L ()   01/09/19  06:00    


 


Total Creatine Kinase  107 U/L ()   01/07/19  20:20    


 


Troponin I  < 0.01 ng/mL  01/08/19  02:05    


 


Total Protein  6.8 g/dL (5.8-8.3)   01/09/19  06:00    


 


Albumin  3.9 g/dL (3.0-4.8)   01/09/19  06:00    


 


Globulin  2.9 gm/dL  01/09/19  06:00    


 


Albumin/Globulin Ratio  1.3  (1.1-1.8)   01/09/19  06:00    


 


Triglycerides  151 mg/dL ()   01/09/19  06:00    


 


Cholesterol  190 mg/dL (130-200)   01/09/19  06:00    


 


LDL Cholesterol Direct  113 mg/dL (0-129)   01/09/19  06:00    


 


HDL Cholesterol  45 mg/dL (29-60)   01/09/19  06:00    


 


Free T4  1.16 ng/dL (0.78-2.19)   01/09/19  06:00    


 


TSH 3rd Generation  2.78 mIU/mL (0.46-4.68)   01/09/19  06:00    














- Hospital Course


Hospital Course: 





Upon Arrival


Pt is a 62 yo female with PMH of CAD with stent placement, anxiety who presents 

with left sided chest pain. Patient states she has had the chest pain since 

November, describes it as achy and says it radiates to left hand. She states the

pain is worse with activity and improves with rest, and worse with palpation. 

Patient has had stress test done in December which was abnormal and showed 

possible ischemia. 





Hospitalization


Pt troponin came back negative, EKG showed no sings of ischemia. Cardiology, Dr Luu recommended placing pt on ASA and plavix, cardiac cath showed clean clara

naries and that her previous stent looked like it was in good condition and 

patent. Pt home meds were continued for her CAD, ASA, plavix, and metoprolol. 





Discharge


Please follow up with primary care physician within 3-5 days. Please follow up 

with cardiologist within 1 week after discharge. Please continue to take 

medications as directed, you are being sent home with prescriptions for:


- aspirin 81mg daily


- metoprolol tartrate 50mg twice a day


- Atorvastatin 40mg daily


- plavix 75mg daily


- protonix 20mg daily


If symptoms return or worsen, please go to the nearest emergency department 











- Date & Time of H&P


Date of H&P: 01/09/19


Time of H&P: 08:00





Discharge Exam





- Head Exam


Head Exam: ATRAUMATIC, NORMOCEPHALIC





- Eye Exam


Eye Exam: EOMI





- ENT Exam


ENT Exam: Mucous Membranes Moist





- Respiratory Exam


Respiratory Exam: NORMAL BREATHING PATTERN.  absent: Accessory Muscle Use, 

Respiratory Distress





- Cardiovascular Exam


Cardiovascular Exam: RRR, +S1, +S2.  absent: Diastolic murmur, Systolic Murmur





- GI/Abdominal Exam


GI & Abdominal Exam: Soft, Unremarkable.  absent: Tenderness





- Extremities Exam


Extremities exam: full ROM, pedal pulses present





- Neurological Exam


Neurological exam: Alert, Oriented x3





- Psychiatric Exam


Psychiatric exam: Normal Affect, Normal Mood





- Skin


Skin Exam: Dry, Intact, Warm





Discharge Plan





- Discharge Medications


Prescriptions: 


Aspirin [Ecotrin] 81 mg PO DAILY #10 tabec


Atorvastatin [Lipitor] 40 mg PO DIN #10 tab


Clopidogrel [Plavix] 75 mg PO DAILY #10 tab


Metoprolol Tartrate [Lopressor] 50 mg PO BID #20 tab


Pantoprazole Sodium [Protonix] 40 mg PO DAILY #30 ect





- Follow Up Plan


Condition: FAIR


Disposition: HOME/ ROUTINE


Instructions:  Chest Pain (ED)


Additional Instructions: 


1. please follow up with your primary care physician within 3-5 days 


2. please follow up with your cardiologist within 1 week


3. please continue to take your medications as directed, you are being sent home

 with prescriptions for:


- aspirin 81mg daily


- metoprolol tartrate 50mg twice a day


- Atorvastatin 40mg daily


- plavix 75mg daily


- protonix 20mg daily


4. if your symptoms return or worsen, please go to the nearest emergency 

department